# Patient Record
Sex: FEMALE | Race: WHITE | NOT HISPANIC OR LATINO
[De-identification: names, ages, dates, MRNs, and addresses within clinical notes are randomized per-mention and may not be internally consistent; named-entity substitution may affect disease eponyms.]

---

## 2019-12-16 PROBLEM — Z78.9 NON-SMOKER: Status: ACTIVE | Noted: 2019-12-16

## 2019-12-17 ENCOUNTER — APPOINTMENT (OUTPATIENT)
Dept: OBGYN | Facility: CLINIC | Age: 29
End: 2019-12-17
Payer: COMMERCIAL

## 2019-12-17 DIAGNOSIS — Z30.9 ENCOUNTER FOR CONTRACEPTIVE MANAGEMENT, UNSPECIFIED: ICD-10-CM

## 2019-12-17 DIAGNOSIS — Z72.89 OTHER PROBLEMS RELATED TO LIFESTYLE: ICD-10-CM

## 2019-12-17 DIAGNOSIS — Z01.419 ENCOUNTER FOR GYNECOLOGICAL EXAMINATION (GENERAL) (ROUTINE) W/OUT ABNORMAL FINDINGS: ICD-10-CM

## 2019-12-17 DIAGNOSIS — Z80.49 FAMILY HISTORY OF MALIGNANT NEOPLASM OF OTHER GENITAL ORGANS: ICD-10-CM

## 2019-12-17 DIAGNOSIS — Z80.3 FAMILY HISTORY OF MALIGNANT NEOPLASM OF BREAST: ICD-10-CM

## 2019-12-17 DIAGNOSIS — Z78.9 OTHER SPECIFIED HEALTH STATUS: ICD-10-CM

## 2019-12-17 PROCEDURE — 99395 PREV VISIT EST AGE 18-39: CPT

## 2019-12-27 PROBLEM — Z80.3 FAMILY HISTORY OF BREAST CANCER: Status: ACTIVE | Noted: 2019-12-27

## 2019-12-27 PROBLEM — Z80.49 FAMILY HISTORY OF MALIGNANT NEOPLASM OF ENDOMETRIUM: Status: ACTIVE | Noted: 2019-12-27

## 2020-02-20 ENCOUNTER — TRANSCRIPTION ENCOUNTER (OUTPATIENT)
Age: 30
End: 2020-02-20

## 2020-02-21 ENCOUNTER — TRANSCRIPTION ENCOUNTER (OUTPATIENT)
Age: 30
End: 2020-02-21

## 2020-12-21 PROBLEM — Z01.419 ENCOUNTER FOR GYNECOLOGICAL EXAMINATION: Status: RESOLVED | Noted: 2019-12-17 | Resolved: 2020-12-21

## 2021-03-05 ENCOUNTER — NON-APPOINTMENT (OUTPATIENT)
Age: 31
End: 2021-03-05

## 2021-04-02 ENCOUNTER — APPOINTMENT (OUTPATIENT)
Dept: INTERNAL MEDICINE | Facility: CLINIC | Age: 31
End: 2021-04-02
Payer: COMMERCIAL

## 2021-04-02 VITALS
DIASTOLIC BLOOD PRESSURE: 80 MMHG | TEMPERATURE: 98 F | HEIGHT: 68.5 IN | HEART RATE: 66 BPM | BODY MASS INDEX: 25.92 KG/M2 | OXYGEN SATURATION: 100 % | SYSTOLIC BLOOD PRESSURE: 130 MMHG | WEIGHT: 173 LBS

## 2021-04-02 DIAGNOSIS — L70.9 ACNE, UNSPECIFIED: ICD-10-CM

## 2021-04-02 PROCEDURE — 99385 PREV VISIT NEW AGE 18-39: CPT | Mod: 25

## 2021-04-02 PROCEDURE — 99072 ADDL SUPL MATRL&STAF TM PHE: CPT

## 2021-04-02 PROCEDURE — 36415 COLL VENOUS BLD VENIPUNCTURE: CPT

## 2021-04-02 NOTE — HISTORY OF PRESENT ILLNESS
[FreeTextEntry1] : Routine physical exam. [de-identified] : This is a 30-year-old female for complete physical. Past medical history significant for mild anxiety disorder and acne. She sees a therapist about every other week. She is on Xanax 0.25 mg b.i.d. p.r.n. which she takes only rarely. Past surgery appendectomy. Medications Aldactone 50 mg daily for acne and Xanax as above. Allergies none. Social history nonsmoker nondrinker. Family history is significant in that father had coronary bypass surgery and bladder cancer. She is 5 feet 9 weight 175. She is  with no children. She has appointment for GYN exam in a few weeks. Current blood pressure 125/85.

## 2021-04-02 NOTE — ASSESSMENT
[FreeTextEntry1] : Patient with mildly elevated blood pressure of 125/85. Patient is advised to check her blood pressure at the time of her GYN exam in a few weeks. Routine labs are drawn. Patient will call for results.

## 2021-04-09 ENCOUNTER — RX RENEWAL (OUTPATIENT)
Age: 31
End: 2021-04-09

## 2021-04-14 LAB
25(OH)D3 SERPL-MCNC: 17.8 NG/ML
ALBUMIN SERPL ELPH-MCNC: 5.1 G/DL
ALP BLD-CCNC: 39 U/L
ALT SERPL-CCNC: 10 U/L
ANION GAP SERPL CALC-SCNC: 16 MMOL/L
AST SERPL-CCNC: 22 U/L
BASOPHILS # BLD AUTO: 0.04 K/UL
BASOPHILS NFR BLD AUTO: 0.4 %
BILIRUB SERPL-MCNC: 0.5 MG/DL
BUN SERPL-MCNC: 15 MG/DL
CALCIUM SERPL-MCNC: 9.9 MG/DL
CHLORIDE SERPL-SCNC: 100 MMOL/L
CHOLEST SERPL-MCNC: 203 MG/DL
CO2 SERPL-SCNC: 22 MMOL/L
CREAT SERPL-MCNC: 0.94 MG/DL
EOSINOPHIL # BLD AUTO: 0.02 K/UL
EOSINOPHIL NFR BLD AUTO: 0.2 %
ESTIMATED AVERAGE GLUCOSE: 94 MG/DL
GLUCOSE SERPL-MCNC: 85 MG/DL
HBA1C MFR BLD HPLC: 4.9 %
HCT VFR BLD CALC: 44 %
HDLC SERPL-MCNC: 104 MG/DL
HGB BLD-MCNC: 13.3 G/DL
IMM GRANULOCYTES NFR BLD AUTO: 0.3 %
LDLC SERPL CALC-MCNC: 85 MG/DL
LYMPHOCYTES # BLD AUTO: 2.16 K/UL
LYMPHOCYTES NFR BLD AUTO: 21.3 %
MAN DIFF?: NORMAL
MCHC RBC-ENTMCNC: 29.6 PG
MCHC RBC-ENTMCNC: 30.2 GM/DL
MCV RBC AUTO: 97.8 FL
MONOCYTES # BLD AUTO: 0.62 K/UL
MONOCYTES NFR BLD AUTO: 6.1 %
NEUTROPHILS # BLD AUTO: 7.26 K/UL
NEUTROPHILS NFR BLD AUTO: 71.7 %
NONHDLC SERPL-MCNC: 99 MG/DL
PLATELET # BLD AUTO: 213 K/UL
POTASSIUM SERPL-SCNC: 4.1 MMOL/L
PROT SERPL-MCNC: 7.2 G/DL
RBC # BLD: 4.5 M/UL
RBC # FLD: 12.4 %
SODIUM SERPL-SCNC: 138 MMOL/L
TRIGL SERPL-MCNC: 73 MG/DL
TSH SERPL-ACNC: 2.49 UIU/ML
WBC # FLD AUTO: 10.13 K/UL

## 2021-05-07 ENCOUNTER — NON-APPOINTMENT (OUTPATIENT)
Age: 31
End: 2021-05-07

## 2021-05-07 ENCOUNTER — APPOINTMENT (OUTPATIENT)
Dept: OBGYN | Facility: CLINIC | Age: 31
End: 2021-05-07
Payer: COMMERCIAL

## 2021-05-07 DIAGNOSIS — Z01.419 ENCOUNTER FOR GYNECOLOGICAL EXAMINATION (GENERAL) (ROUTINE) W/OUT ABNORMAL FINDINGS: ICD-10-CM

## 2021-05-07 PROCEDURE — 99395 PREV VISIT EST AGE 18-39: CPT

## 2021-05-07 PROCEDURE — 99072 ADDL SUPL MATRL&STAF TM PHE: CPT

## 2021-05-11 PROBLEM — Z01.419 ENCOUNTER FOR ANNUAL ROUTINE GYNECOLOGICAL EXAMINATION: Status: RESOLVED | Noted: 2021-05-07 | Resolved: 2021-05-21

## 2021-05-11 NOTE — HISTORY OF PRESENT ILLNESS
[FreeTextEntry1] : 30 y o P0 female presents for routine annual GYN care\par Last pap smear 1/2019 NILM\par Denies current GYN complaints\par Reports normal bowel and bladder function\par Sexually active w/ male partner : uses combination OC w/o adverse effects\par Has monthly menses: normal flow/duration

## 2021-06-01 ENCOUNTER — APPOINTMENT (OUTPATIENT)
Dept: INTERNAL MEDICINE | Facility: CLINIC | Age: 31
End: 2021-06-01
Payer: COMMERCIAL

## 2021-06-01 VITALS
TEMPERATURE: 96.5 F | DIASTOLIC BLOOD PRESSURE: 80 MMHG | SYSTOLIC BLOOD PRESSURE: 130 MMHG | OXYGEN SATURATION: 99 % | BODY MASS INDEX: 26.97 KG/M2 | WEIGHT: 180 LBS | HEIGHT: 68.5 IN | HEART RATE: 75 BPM

## 2021-06-01 PROCEDURE — 99213 OFFICE O/P EST LOW 20 MIN: CPT

## 2021-06-01 PROCEDURE — 99072 ADDL SUPL MATRL&STAF TM PHE: CPT

## 2021-06-01 NOTE — HISTORY OF PRESENT ILLNESS
[FreeTextEntry1] : Increasing symptoms of depression and anxiety. [de-identified] : For the last few months patient feels she is increasingly depressed and anxious. She she is irritable with lack of motivation trouble sleeping and depressed. She previously was on Wellbutrin and gabapentin which helped her. She's been off of that for about 2 years. She sees a therapist weekly and was told that she needs medication. She has no other chronic medical problems.

## 2021-06-01 NOTE — ASSESSMENT
[FreeTextEntry1] : Patient with symptoms of depression and anxiety. We'll renew Wellbutrin  mg daily and gabapentin 100 mg t.i.d. Patient is to return in 6 weeks for followup.

## 2021-07-16 ENCOUNTER — APPOINTMENT (OUTPATIENT)
Dept: INTERNAL MEDICINE | Facility: CLINIC | Age: 31
End: 2021-07-16
Payer: COMMERCIAL

## 2021-07-16 VITALS
SYSTOLIC BLOOD PRESSURE: 120 MMHG | DIASTOLIC BLOOD PRESSURE: 80 MMHG | HEIGHT: 68.5 IN | BODY MASS INDEX: 26.97 KG/M2 | OXYGEN SATURATION: 98 % | HEART RATE: 71 BPM | TEMPERATURE: 96.7 F | WEIGHT: 180 LBS

## 2021-07-16 PROCEDURE — 99213 OFFICE O/P EST LOW 20 MIN: CPT

## 2021-07-16 PROCEDURE — 99072 ADDL SUPL MATRL&STAF TM PHE: CPT

## 2021-07-16 NOTE — ASSESSMENT
[FreeTextEntry1] : The patient is doing clinically quite well on Wellbutrin . She is to continue current dose and I will renew medicines as needed

## 2021-07-16 NOTE — HISTORY OF PRESENT ILLNESS
[FreeTextEntry1] : Followup anxiety and depression. [de-identified] : Patient is feeling much better since starting Wellbutrin XL 50 mg daily. She is not taking gabapentin now she doesn't feel the need to take a higher dose of medication. She denies side effects. She denies anxiety with depression she feels much better. She is still seeing a therapist weekly. She wants to continue Wellbutrin at the same dose.

## 2021-08-27 LAB
CYTOLOGY CVX/VAG DOC THIN PREP: NORMAL
HPV HIGH+LOW RISK DNA PNL CVX: NOT DETECTED

## 2021-11-23 ENCOUNTER — RX RENEWAL (OUTPATIENT)
Age: 31
End: 2021-11-23

## 2021-12-27 ENCOUNTER — APPOINTMENT (OUTPATIENT)
Dept: INTERNAL MEDICINE | Facility: CLINIC | Age: 31
End: 2021-12-27
Payer: COMMERCIAL

## 2021-12-27 VITALS
DIASTOLIC BLOOD PRESSURE: 86 MMHG | SYSTOLIC BLOOD PRESSURE: 130 MMHG | HEIGHT: 68.5 IN | BODY MASS INDEX: 26.97 KG/M2 | WEIGHT: 180 LBS | HEART RATE: 71 BPM | OXYGEN SATURATION: 99 % | TEMPERATURE: 97.4 F

## 2021-12-27 DIAGNOSIS — F41.9 ANXIETY DISORDER, UNSPECIFIED: ICD-10-CM

## 2021-12-27 PROCEDURE — 99213 OFFICE O/P EST LOW 20 MIN: CPT

## 2021-12-27 NOTE — HISTORY OF PRESENT ILLNESS
[FreeTextEntry1] : Medication renewal [de-identified] : Patient with history of anxiety and depression on Wellbutrin 150 mg daily. She is doing generally well is seeing a therapist every other week. She takes Xanax 0.25 mg on an outpatient 3 times a week. She would like a renewal on Xanax. Her last renewal was about 3 months ago.

## 2021-12-27 NOTE — ASSESSMENT
[FreeTextEntry1] : Patient with anxiety and depression well controlled. We'll renew Xanax 0.25 mg daily on a p.r.n. basis

## 2022-02-15 ENCOUNTER — RX RENEWAL (OUTPATIENT)
Age: 32
End: 2022-02-15

## 2022-02-16 ENCOUNTER — RX RENEWAL (OUTPATIENT)
Age: 32
End: 2022-02-16

## 2022-04-29 ENCOUNTER — APPOINTMENT (OUTPATIENT)
Dept: INTERNAL MEDICINE | Facility: CLINIC | Age: 32
End: 2022-04-29
Payer: COMMERCIAL

## 2022-04-29 VITALS
SYSTOLIC BLOOD PRESSURE: 140 MMHG | OXYGEN SATURATION: 99 % | HEIGHT: 68.5 IN | DIASTOLIC BLOOD PRESSURE: 90 MMHG | TEMPERATURE: 96.5 F | BODY MASS INDEX: 26.97 KG/M2 | HEART RATE: 75 BPM | WEIGHT: 180 LBS

## 2022-04-29 DIAGNOSIS — Z00.00 ENCOUNTER FOR GENERAL ADULT MEDICAL EXAMINATION W/OUT ABNORMAL FINDINGS: ICD-10-CM

## 2022-04-29 DIAGNOSIS — F32.A DEPRESSION, UNSPECIFIED: ICD-10-CM

## 2022-04-29 PROCEDURE — 36415 COLL VENOUS BLD VENIPUNCTURE: CPT

## 2022-04-29 PROCEDURE — 99395 PREV VISIT EST AGE 18-39: CPT | Mod: 25

## 2022-04-29 NOTE — HISTORY OF PRESENT ILLNESS
[FreeTextEntry1] : Routine yearly physical. [de-identified] : This is a 31-year-old female with a history of anxiety and depression. She sees a therapist every other week. She is generally doing well and her psychiatric issues. She is maintained on Wellbutrin 150 daily and rare Xanax. She had covid in January. She says since then she has periods of exhaustion. This can come on in waves. It is does not occur daily. She is sleeping well. She has no shortness of breath. Her weight is 193 current blood pressure 130/85.

## 2022-04-29 NOTE — ASSESSMENT
[FreeTextEntry1] : Patient with periods of exhaustion after covid in January. It is unclear if this is related to depression. We'll renew current medications. We'll check routine labs and speak to the patient next week.

## 2022-05-05 LAB
25(OH)D3 SERPL-MCNC: 40.2 NG/ML
ALBUMIN SERPL ELPH-MCNC: 4.7 G/DL
ALP BLD-CCNC: 47 U/L
ALT SERPL-CCNC: 11 U/L
ANION GAP SERPL CALC-SCNC: 18 MMOL/L
AST SERPL-CCNC: 17 U/L
BASOPHILS # BLD AUTO: 0.04 K/UL
BASOPHILS NFR BLD AUTO: 0.5 %
BILIRUB SERPL-MCNC: 0.4 MG/DL
BUN SERPL-MCNC: 12 MG/DL
CALCIUM SERPL-MCNC: 9.6 MG/DL
CHLORIDE SERPL-SCNC: 106 MMOL/L
CHOLEST SERPL-MCNC: 219 MG/DL
CO2 SERPL-SCNC: 20 MMOL/L
CREAT SERPL-MCNC: 0.9 MG/DL
EGFR: 88 ML/MIN/1.73M2
EOSINOPHIL # BLD AUTO: 0.01 K/UL
EOSINOPHIL NFR BLD AUTO: 0.1 %
ESTIMATED AVERAGE GLUCOSE: 100 MG/DL
GLUCOSE SERPL-MCNC: 99 MG/DL
HBA1C MFR BLD HPLC: 5.1 %
HCT VFR BLD CALC: 42.3 %
HDLC SERPL-MCNC: 100 MG/DL
HGB BLD-MCNC: 13.4 G/DL
IMM GRANULOCYTES NFR BLD AUTO: 0.3 %
LDLC SERPL CALC-MCNC: 105 MG/DL
LYMPHOCYTES # BLD AUTO: 2.26 K/UL
LYMPHOCYTES NFR BLD AUTO: 28.9 %
MAN DIFF?: NORMAL
MCHC RBC-ENTMCNC: 30 PG
MCHC RBC-ENTMCNC: 31.7 GM/DL
MCV RBC AUTO: 94.8 FL
MONOCYTES # BLD AUTO: 0.56 K/UL
MONOCYTES NFR BLD AUTO: 7.2 %
NEUTROPHILS # BLD AUTO: 4.92 K/UL
NEUTROPHILS NFR BLD AUTO: 63 %
NONHDLC SERPL-MCNC: 119 MG/DL
PLATELET # BLD AUTO: 197 K/UL
POTASSIUM SERPL-SCNC: 4.1 MMOL/L
PROT SERPL-MCNC: 7.2 G/DL
RBC # BLD: 4.46 M/UL
RBC # FLD: 11.7 %
SODIUM SERPL-SCNC: 143 MMOL/L
TRIGL SERPL-MCNC: 66 MG/DL
TSH SERPL-ACNC: 2.42 UIU/ML
WBC # FLD AUTO: 7.81 K/UL

## 2022-05-13 ENCOUNTER — APPOINTMENT (OUTPATIENT)
Dept: OBGYN | Facility: CLINIC | Age: 32
End: 2022-05-13
Payer: COMMERCIAL

## 2022-05-13 ENCOUNTER — NON-APPOINTMENT (OUTPATIENT)
Age: 32
End: 2022-05-13

## 2022-05-13 VITALS
HEIGHT: 68.5 IN | SYSTOLIC BLOOD PRESSURE: 120 MMHG | WEIGHT: 190 LBS | DIASTOLIC BLOOD PRESSURE: 70 MMHG | BODY MASS INDEX: 28.46 KG/M2

## 2022-05-13 DIAGNOSIS — Z01.419 ENCOUNTER FOR GYNECOLOGICAL EXAMINATION (GENERAL) (ROUTINE) W/OUT ABNORMAL FINDINGS: ICD-10-CM

## 2022-05-13 PROCEDURE — 99395 PREV VISIT EST AGE 18-39: CPT

## 2022-05-13 RX ORDER — SPIRONOLACTONE 50 MG/1
50 TABLET ORAL DAILY
Qty: 90 | Refills: 3 | Status: DISCONTINUED | COMMUNITY
Start: 2021-04-02 | End: 2022-05-13

## 2022-05-13 RX ORDER — GABAPENTIN 100 MG/1
100 CAPSULE ORAL 3 TIMES DAILY
Qty: 90 | Refills: 5 | Status: DISCONTINUED | COMMUNITY
Start: 2021-06-01 | End: 2022-05-13

## 2023-05-05 ENCOUNTER — APPOINTMENT (OUTPATIENT)
Dept: INTERNAL MEDICINE | Facility: CLINIC | Age: 33
End: 2023-05-05

## 2023-05-16 ENCOUNTER — APPOINTMENT (OUTPATIENT)
Dept: OBGYN | Facility: CLINIC | Age: 33
End: 2023-05-16

## 2024-06-01 ENCOUNTER — RESULT REVIEW (OUTPATIENT)
Age: 34
End: 2024-06-01

## 2024-06-01 ENCOUNTER — TRANSCRIPTION ENCOUNTER (OUTPATIENT)
Age: 34
End: 2024-06-01

## 2024-06-03 ENCOUNTER — RESULT REVIEW (OUTPATIENT)
Age: 34
End: 2024-06-03

## 2024-06-05 ENCOUNTER — TRANSCRIPTION ENCOUNTER (OUTPATIENT)
Age: 34
End: 2024-06-05

## 2024-06-06 ENCOUNTER — NON-APPOINTMENT (OUTPATIENT)
Age: 34
End: 2024-06-06

## 2024-06-06 ENCOUNTER — APPOINTMENT (OUTPATIENT)
Dept: NEUROLOGY | Facility: CLINIC | Age: 34
End: 2024-06-06
Payer: COMMERCIAL

## 2024-06-06 VITALS
OXYGEN SATURATION: 98 % | DIASTOLIC BLOOD PRESSURE: 80 MMHG | HEIGHT: 68 IN | HEART RATE: 79 BPM | SYSTOLIC BLOOD PRESSURE: 131 MMHG

## 2024-06-06 DIAGNOSIS — G95.9 DISEASE OF SPINAL CORD, UNSPECIFIED: ICD-10-CM

## 2024-06-06 PROCEDURE — 99215 OFFICE O/P EST HI 40 MIN: CPT

## 2024-06-06 PROCEDURE — 99205 OFFICE O/P NEW HI 60 MIN: CPT

## 2024-06-06 RX ORDER — ALPRAZOLAM 0.25 MG/1
0.25 TABLET ORAL
Qty: 60 | Refills: 0 | Status: DISCONTINUED | COMMUNITY
Start: 2021-12-20 | End: 2024-06-06

## 2024-06-06 RX ORDER — NORGESTIMATE AND ETHINYL ESTRADIOL 7DAYSX3 LO
0.18/0.215/0.25 KIT ORAL
Qty: 84 | Refills: 3 | Status: DISCONTINUED | COMMUNITY
Start: 2019-12-17 | End: 2024-06-06

## 2024-06-06 RX ORDER — BUPROPION HYDROCHLORIDE 150 MG/1
150 TABLET, EXTENDED RELEASE ORAL DAILY
Qty: 90 | Refills: 3 | Status: DISCONTINUED | COMMUNITY
Start: 2022-04-29 | End: 2024-06-06

## 2024-06-06 RX ORDER — ALPRAZOLAM 0.25 MG/1
0.25 TABLET ORAL
Qty: 60 | Refills: 0 | Status: DISCONTINUED | COMMUNITY
Start: 2022-11-07 | End: 2024-06-06

## 2024-06-06 RX ORDER — ALPRAZOLAM 0.25 MG/1
0.25 TABLET ORAL
Qty: 60 | Refills: 0 | Status: DISCONTINUED | COMMUNITY
Start: 2021-04-02 | End: 2024-06-06

## 2024-06-06 RX ORDER — ALPRAZOLAM 0.25 MG/1
0.25 TABLET ORAL
Qty: 60 | Refills: 0 | Status: DISCONTINUED | COMMUNITY
Start: 2022-04-29 | End: 2024-06-06

## 2024-06-06 RX ORDER — ALPRAZOLAM 0.25 MG/1
0.25 TABLET ORAL
Qty: 60 | Refills: 0 | Status: DISCONTINUED | COMMUNITY
Start: 2022-08-09 | End: 2024-06-06

## 2024-06-06 RX ORDER — PRENATAL VIT,CAL 74/IRON/FOLIC 27 MG-1 MG
TABLET ORAL
Refills: 0 | Status: ACTIVE | COMMUNITY

## 2024-06-12 ENCOUNTER — NON-APPOINTMENT (OUTPATIENT)
Age: 34
End: 2024-06-12

## 2024-06-12 NOTE — HISTORY OF PRESENT ILLNESS
[FreeTextEntry1] : 6/6/24 This is a 34 y/o RH woman who is being seen in neurologic consultation for possible myelitis. She is 5 weeks pregnant.   She was admitted to [facility] after 2 weeks of progressive left leg numbness from hip/buttock to toe that eventually crossed midline involving groin area and developed left leg weakness as well 5 days prior to admission. She was placed in observation unit, lumbar MRI: abnormal signal in the distal cord and conus medullaris indicative of myelopathy, small herniation L4-5, small protrusion L5-S1 mild underlying degenerative changes.  "... at the level of T11-T12 that may represent a spinal cord mass, evolving spinal cord infarct, demyelinating disease, an infectious process, or be inflammatory in etiology, old mild superior endplate compression fracture at T9, multilevel cervical spondylosis, mild spinal canal stenosis and mild-to-moderate neuroforaminal stenosis." MRI brain: normal. Repeat MRI of cervical, thoracic, and lumbar spines with contrast (risk/benefit discussed as patient's pregnancy test came back positive, LMP 05/01/2024 making AOG 4 weeks, benefit at this time outweigh risk - evaluated by OB): enhancing 9 x 9 mm lesion in the left aspect of the thoracic cord at the level of the upper T12 vertebral body. "  FHx significant for an uncle with undiagnosed neurodegenerative d/o (possible ALS but with atypical symptoms) and paternal grandmother with nonspecific autoimmune d/o, patient's mother had multiple miscarriages.  There was a multispecialty meeting with neurology, neuroradiology, neurosurgery, and ICU (including [facility] neurologist neuroradiologist) Top differential dx: autoimmune inflammatory process, less likely malignancy (although ESR CRP are low) She finished 5 days of steroids and is on a tapering dose.

## 2024-06-12 NOTE — ASSESSMENT
[FreeTextEntry1] : Autoimmune myelitis/myelopathy CSF studies still pending  CSF flow cytometry pending No cytology sent CSF NMO - pending  Oligoclonal bands- pending  CSF- pending  CSF [place] IgG Ab pending CSF west IgM Ab pending   CSF glucose 57 CSF total protein 41  CSF WBC tube 1 1/1, tube 4 20 CSF RBC tube 1 0, tube 4 35  (lymphocytes 71%)  CSF IgG- 2/2 CSF IgG/Albumin 0.13 CSF/serum IgG index 0/7 CSF/Serum IgG Synthesis 0.9 mg/day  I did not find serum MOG or NMO.  Will request neuro-immunology consult from Dr. Madden  At present she is on tapering dose of Prednisone (60 mg with plans for weaning off over one week)- She will be on 30 mg on friday June 14th. Will likely need to maintain on prednisone.  Names of high risk OB doctors given to patient - she has an appointment with general OB Dr. Shamir Richardson.

## 2024-06-12 NOTE — DATA REVIEWED
[de-identified] : Radiology MRI: MR CERVICAL, THORACIC, AND LUMBAR SPINE WITH CONTRAST  TECHNIQUE: Multiplanar multisequence imaging of the cervical, thoracic, and lumbar spine was performed after the administration of intravenous contrast.  CONTRAST: 8 mL gadavist was administered. 0 mL was discarded.  CLINICAL INDICATION: Bilateral lower extremity numbness and left lower extremity weakness. COMPARISON: MR of the cervical, thoracic, and lumbar spine without contrast 6/1/2024. ____________________ FINDINGS:  CERVICAL SPINE: Similar cervical spine alignment as on 6/1/2024. Cervical vertebral body heights appear maintained. No suspicious enhancing osseous foci.  No suspicious foci of intrathecal enhancement.   THORACIC SPINE: Mildly degraded by motion artifact. Similar thoracic alignment as on 6/1/2024. Unchanged mild superior endplate compression fracture at T9. Remainder of the thoracic vertebral body heights are maintained. No enhancing aggressive osseous lesions.  On the axial postcontrast sequence there is subtle enhancement in the left posterior aspect of the cord extending from the level of the T11-T12 intervertebral disc space to the level of the mid T12 vertebral body with a slight ovoid appearance and measures approximately 4 x 2 mm images 56-58 of series 23. There is a 9 x 9 mm focus of enhancement in the posterior left aspect of the cord better seen on the postcontrast imaging of the lumbar spine image 10 of series 19 at the upper aspect of the T12 vertebral body..  LUMBAR SPINE: Mildly degraded by motion artifact.  Similar lumbar alignment as on 6/1/2024. Lumbar vertebral body heights are maintained. No enhancing aggressive osseous lesions.  There is a 9 x 9 mm focus of enhancement in the posterior left aspect of the cord at the level of the upper T12 vertebral body. Otherwise, no suspicious foci intrathecal enhancement in the lumbar spine. ____________________ IMPRESSION:  1. There is an enhancing 9 x 9 mm lesion in the left aspect of the thoracic cord at the level of the upper T12 vertebral body best seen on the postcontrast MR images of the lumbar spine image 10 of series 19. Differential considerations include an ependymoma, astrocytoma, or demyelinating disease among other less likely possibilities.  This report was sent via Teams to Dr. Palak Rose at 6/2/2024 9:19 PM.  --- End of Report ---  Electronically Signed: ____________________________________________ Edmund Hernandez MD 06/02/24 2120

## 2024-06-12 NOTE — PHYSICAL EXAM
[FreeTextEntry1] : Physical examination  General: No acute distress, Awake, Alert.   Mental status  Awake, alert, and oriented to person, time and place, Normal attention span and concentration, Recent and remote memory intact, Language intact, Fund of knowledge intact.  Cranial Nerves  II: VFF  III, IV, VI: PERRL, EOMI.  V: Facial sensation is normal B/L.  VII: Facial strength is normal B/L.  VIII: Gross hearing is intact.  IX, X: Palate is midline and elevates symmetrically.  XI: Trapezius normal strength.  XII: Tongue midline without atrophy or fasciculations.   Motor exam  Muscle tone - no evidence of rigidity or resistance in all 4 extremities.  No atrophy or fasciculations  Muscle Strength: Arms- normal Legs- LLE proximal 4+, distal 4+ including EHL RLE proximal 4+, distal 5/5 No UE drift. 5/5 strength b/l UE  Reflexes  All present, normal, and symmetrical.  brisk reflexes in the knees Plantars right: mute.  Plantars left: mute.   Coordination  Finger to nose: Normal.  Heel to shin: Normal.   Sensory  Impaired vibration - absent on the left, inconsistent pp; proprioception intact Romberg present  Gait  wide-based; steady with walker

## 2024-06-13 ENCOUNTER — APPOINTMENT (OUTPATIENT)
Dept: NEUROSURGERY | Facility: CLINIC | Age: 34
End: 2024-06-13

## 2024-06-14 ENCOUNTER — APPOINTMENT (OUTPATIENT)
Dept: NEUROLOGY | Facility: CLINIC | Age: 34
End: 2024-06-14
Payer: COMMERCIAL

## 2024-06-14 VITALS
TEMPERATURE: 98 F | OXYGEN SATURATION: 99 % | DIASTOLIC BLOOD PRESSURE: 90 MMHG | HEIGHT: 68 IN | SYSTOLIC BLOOD PRESSURE: 142 MMHG | WEIGHT: 185 LBS | HEART RATE: 95 BPM | BODY MASS INDEX: 28.04 KG/M2

## 2024-06-14 PROCEDURE — G2211 COMPLEX E/M VISIT ADD ON: CPT

## 2024-06-14 PROCEDURE — 99417 PROLNG OP E/M EACH 15 MIN: CPT

## 2024-06-14 PROCEDURE — G2212 PROLONG OUTPT/OFFICE VIS: CPT

## 2024-06-14 PROCEDURE — 99215 OFFICE O/P EST HI 40 MIN: CPT

## 2024-06-14 RX ORDER — PREDNISONE 5 MG/1
5 TABLET ORAL
Qty: 200 | Refills: 3 | Status: ACTIVE | COMMUNITY
Start: 2024-06-14 | End: 1900-01-01

## 2024-06-14 RX ORDER — PREDNISONE 50 MG/1
TABLET ORAL
Refills: 0 | Status: ACTIVE | COMMUNITY

## 2024-06-14 RX ORDER — PREDNISONE 2.5 MG/1
2.5 TABLET ORAL
Qty: 200 | Refills: 3 | Status: ACTIVE | COMMUNITY
Start: 2024-06-14 | End: 1900-01-01

## 2024-06-14 RX ORDER — PREDNISONE 10 MG/1
10 TABLET ORAL
Qty: 200 | Refills: 3 | Status: ACTIVE | COMMUNITY
Start: 2024-06-14 | End: 1900-01-01

## 2024-06-14 NOTE — ASSESSMENT
[FreeTextEntry1] : 33-year-old woman with a lower thoracic/conus myelitis presenting with lower extremity dysesthesia, R>L leg weakness, sensory ataxia with gait dysfunction.   She demonstrates early clinical response to corticosteroids with residual sensorimotor and gait abnormalities.  MRI demonstrates a centrally predominant conus lesion with peripheral enhancement suggestive of CNS demyelination.  CSF inflammatory with pleocytosis and oligoclonal bands. 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  It does not appear to be tumor, infection, or vascular in nature, more likely immune-mediated.  She will be comanaged along with maternal-fetal medicine to mitigate risk to the pregnancy.  We discussed some of the potential risks of corticosteroids during pregnancy including cardiovascular, endocrine, GI, infectious, ophthalmic, osteopenia and osteoporosis, avascular necrosis of the hip, psychiatric effects and sleep disturbance and risks on the pregnancy including teratogenic effects including cleft palate and hypoadrenalism.  Plan: Taper prednisone from 30 mg to 20 mg after 2 weeks then decrease by 5 mg every 2 weeks until at 10 mg and decrease by 2.5 mg every 2 weeks until discontinued. We discussed deferring Bactrim and risedronate due to uncertain effects on the pregnancy although these are used to mitigate risk of pneumocystis and osteopenia, deferred to limit exposure to the developing fetus, particularly as we are tapering below 20 mg relatively quickly. Can consider increasing dose of corticosteroids and adding IVIG if clinical deterioration Urgent repeat MRI brain cervical and thoracic spine.  Deferred gadolinium use per patient preference to avoid effects on the fetus.  For authorization: The thoracic MRI is to evaluate for treatment response and evolution of the lesion while the MRI brain and cervical spine are to assess for new lesions as there is known tendency for radiologic lag in certain neuroinflammatory disorders, like, MOGAD. Check serum aquaporin 4 and MOG antibody status and preinitiation labs in case further immunotherapy is required Explained that if clinical deterioration to go to the emergency department and contact our office immediately Establish high risk maternal-fetal medicine consultation Referred to physiatry and physical therapy 1 month virtual visit

## 2024-06-14 NOTE — PHYSICAL EXAM
[FreeTextEntry1] : AO3. Normally conversant. full affect. Follows commands, names, and repeats. Good attention.     PERRL, VFF, EOMI, no nystagmus, face symmetric, TUP at midline.     Motor:        R:    L:  Del      5    5  Bi      5    5  Tri      5    5  Wrist Extensors   5    5  Finger abductors   5    5       5    5     HF      5    4+  KE      5    5  KF      5    5  DF      5    5  PF      5    5     Tone     R    L  UE        0     0  LE        0     0     Sensory    diminished vibration above the knees and sensory level at the waist to pinprick and temperature   Reflexes:        R    L   Biceps   2 2  BR    1 1  Pat     1 0  AJ     1 0     TOES     mute    E     Coordination:        R    L  FTN     0    0  HEDY     0    0  HTS     0    0       Gait: Unsteady gait        EDSS Step: 4.5       Functional system scores: Visual 0 Brainstem 0 Pyramidal 4 Cerebellar 0 Sensory 3 Bowel and bladder 0 Cerebral 0

## 2024-06-14 NOTE — DATA REVIEWED
[de-identified] : Lumbar puncture June 4, 2024 CSF >20, RBC 0, protein 35, glucose 66 lymphocytic predominant, oligoclonal bands present, cytology/flow cytometry negative for malignancy, PCR negative, cultures negative, syphilis negative  aquaporin 4 negative (tested in CSF, not the preferred specimen) Lyme negative B12 735 Folate greater than 20 IgG index 0.7 HIV negative Vitamin D40.2 TSH 2.4 A1c 5.2  MRI brain cervical and thoracic with and without gadolinium June 2024 demonstrates a lower thoracic conus lesion centrally predominant mildly expansile T2 hyperintensity with a subtle focus of enhancement

## 2024-06-14 NOTE — HISTORY OF PRESENT ILLNESS
[FreeTextEntry1] : 33-year-old woman 6 weeks pregnant with a conus spinal cord lesion referred to the autoimmune brain disorder center at [facility] for further diagnostic workup and management.  Symptoms began May 13, 2024 with right buttock tingling.  May 21 she discovered that she was pregnant with a positive pregnancy test.  May 23 she developed a painful cramp of the left thigh.  May 25 there was pins-and-needles in both legs.  By May 26 both legs were entirely numb by June 1 she developed associated weakness and gait dysfunction as well as the numbness from the pelvis down both of her legs.  She got MRIs that demonstrated a lower thoracic conus spinal cord lesion.  She received IV methylprednisolone 1 g for 5 days followed by prednisone 60 mg tapered every 2 days x 10 mg and is currently on 30 mg daily.  June 11 she felt as though she had more left knee numbness and questionable change in the sensory characteristics in the back of the thigh with pins-and-needles and burning sensation.  She has been referred to a high risk maternal-fetal medicine specialist with an appointment July 8, 2024 (Dr. Yassine Carmona).  She has only been taking Tylenol for the neuropathic pain in her legs and finds it to be tolerable at this time.  She has significant gait dysfunction and feels as though she does not know where her legs are in space.  Since starting the corticosteroids she has experienced an improvement in motor strength but still has residual sensory dysfunction.

## 2024-06-15 ENCOUNTER — LABORATORY RESULT (OUTPATIENT)
Age: 34
End: 2024-06-15

## 2024-06-17 LAB
25(OH)D3 SERPL-MCNC: 26.7 NG/ML
ALBUMIN MFR SERPL ELPH: 63 %
ALBUMIN SERPL ELPH-MCNC: 4.8 G/DL
ALBUMIN SERPL-MCNC: 4.5 G/DL
ALBUMIN/GLOB SERPL: 1.7 RATIO
ALP BLD-CCNC: 47 U/L
ALPHA1 GLOB MFR SERPL ELPH: 3.9 %
ALPHA1 GLOB SERPL ELPH-MCNC: 0.3 G/DL
ALPHA2 GLOB MFR SERPL ELPH: 9.4 %
ALPHA2 GLOB SERPL ELPH-MCNC: 0.7 G/DL
ALT SERPL-CCNC: 30 U/L
ANION GAP SERPL CALC-SCNC: 15 MMOL/L
AST SERPL-CCNC: 15 U/L
B-GLOBULIN MFR SERPL ELPH: 10.6 %
B-GLOBULIN SERPL ELPH-MCNC: 0.8 G/DL
BASOPHILS # BLD AUTO: 0.02 K/UL
BASOPHILS NFR BLD AUTO: 0.1 %
BILIRUB SERPL-MCNC: 0.9 MG/DL
BUN SERPL-MCNC: 12 MG/DL
C3 SERPL-MCNC: 112 MG/DL
C4 SERPL-MCNC: 16 MG/DL
CALCIUM SERPL-MCNC: 9.9 MG/DL
CD16+CD56+ CELLS # BLD: 91 CELLS/UL
CD16+CD56+ CELLS NFR BLD: 7 %
CD19 CELLS NFR BLD: 212 CELLS/UL
CD3 CELLS # BLD: 1007 CELLS/UL
CD3 CELLS NFR BLD: 74 %
CD3+CD4+ CELLS # BLD: 516 CELLS/UL
CD3+CD4+ CELLS NFR BLD: 37 %
CD3+CD4+ CELLS/CD3+CD8+ CLL SPEC: 1.29 RATIO
CD3+CD8+ CELLS # SPEC: 399 CELLS/UL
CD3+CD8+ CELLS NFR BLD: 29 %
CELLS.CD3-CD19+/CELLS IN BLOOD: 16 %
CHLORIDE SERPL-SCNC: 101 MMOL/L
CHOLEST SERPL-MCNC: 217 MG/DL
CO2 SERPL-SCNC: 21 MMOL/L
CREAT SERPL-MCNC: 0.63 MG/DL
DEPRECATED KAPPA LC FREE/LAMBDA SER: 1.02 RATIO
EGFR: 121 ML/MIN/1.73M2
ENA RNP AB SER IA-ACNC: <0.2 AL
ENA SM AB SER IA-ACNC: <0.2 AL
EOSINOPHIL # BLD AUTO: 0 K/UL
EOSINOPHIL NFR BLD AUTO: 0 %
ESTIMATED AVERAGE GLUCOSE: 105 MG/DL
GAMMA GLOB FLD ELPH-MCNC: 0.9 G/DL
GAMMA GLOB MFR SERPL ELPH: 13.1 %
GLUCOSE SERPL-MCNC: 101 MG/DL
HBA1C MFR BLD HPLC: 5.3 %
HBV CORE IGG+IGM SER QL: NONREACTIVE
HBV SURFACE AB SERPL IA-ACNC: 79 MIU/ML
HBV SURFACE AG SER QL: NONREACTIVE
HCT VFR BLD CALC: 42.3 %
HCV AB SER QL: NONREACTIVE
HCV S/CO RATIO: 0.07 S/CO
HDLC SERPL-MCNC: 98 MG/DL
HGB BLD-MCNC: 13.5 G/DL
HIV1+2 AB SPEC QL IA.RAPID: NONREACTIVE
IGA SER QL IEP: 127 MG/DL
IGG SER QL IEP: 802 MG/DL
IGM SER QL IEP: 264 MG/DL
IMM GRANULOCYTES NFR BLD AUTO: 0.7 %
INTERPRETATION SERPL IEP-IMP: NORMAL
KAPPA LC CSF-MCNC: 0.65 MG/DL
KAPPA LC SERPL-MCNC: 0.66 MG/DL
LDLC SERPL CALC-MCNC: 108 MG/DL
LYMPHOCYTES # BLD AUTO: 1.52 K/UL
LYMPHOCYTES NFR BLD AUTO: 9.5 %
MAN DIFF?: NORMAL
MCHC RBC-ENTMCNC: 29.5 PG
MCHC RBC-ENTMCNC: 31.9 GM/DL
MCV RBC AUTO: 92.6 FL
MONOCYTES # BLD AUTO: 0.39 K/UL
MONOCYTES NFR BLD AUTO: 2.4 %
NEUTROPHILS # BLD AUTO: 13.96 K/UL
NEUTROPHILS NFR BLD AUTO: 87.3 %
NONHDLC SERPL-MCNC: 119 MG/DL
PLATELET # BLD AUTO: 287 K/UL
POTASSIUM SERPL-SCNC: 4.4 MMOL/L
PROT SERPL-MCNC: 7.1 G/DL
RBC # BLD: 4.57 M/UL
RBC # FLD: 12 %
RHEUMATOID FACT SER QL: <10 IU/ML
SODIUM SERPL-SCNC: 137 MMOL/L
T PALLIDUM AB SER QL IA: NEGATIVE
TRIGL SERPL-MCNC: 59 MG/DL
TSH SERPL-ACNC: 0.58 UIU/ML
VIT B12 SERPL-MCNC: 838 PG/ML
WBC # FLD AUTO: 16.01 K/UL

## 2024-06-18 LAB — ANA SER IF-ACNC: NEGATIVE

## 2024-06-19 LAB
M TB IFN-G BLD-IMP: NEGATIVE
QUANTIFERON TB PLUS MITOGEN MINUS NIL: 4.46 IU/ML
QUANTIFERON TB PLUS NIL: 0.02 IU/ML
QUANTIFERON TB PLUS TB1 MINUS NIL: 0.02 IU/ML
QUANTIFERON TB PLUS TB2 MINUS NIL: 0.01 IU/ML
SSA-52 (RO52) (ENA) ANTIBODY, IGG: 1 AU/ML
SSA-60 (RO60) (ENA) ANTIBODY, IGG: 0 AU/ML
VZV AB TITR SER: POSITIVE
VZV IGG SER IF-ACNC: 513.9 INDEX

## 2024-06-20 LAB
AQP4 H2O CHANNEL AB SERPL IA-ACNC: NEGATIVE
JCV INDEX: 2.25
STRATIFY JCV ANTIBODY: POSITIVE

## 2024-06-21 ENCOUNTER — APPOINTMENT (OUTPATIENT)
Dept: PAIN MANAGEMENT | Facility: CLINIC | Age: 34
End: 2024-06-21
Payer: COMMERCIAL

## 2024-06-21 VITALS
DIASTOLIC BLOOD PRESSURE: 79 MMHG | SYSTOLIC BLOOD PRESSURE: 142 MMHG | BODY MASS INDEX: 28.73 KG/M2 | HEIGHT: 68 IN | WEIGHT: 189.59 LBS | OXYGEN SATURATION: 100 % | HEART RATE: 80 BPM

## 2024-06-21 PROCEDURE — 99203 OFFICE O/P NEW LOW 30 MIN: CPT

## 2024-06-21 RX ORDER — MAGNESIUM OXIDE/MAG AA CHELATE 300 MG
CAPSULE ORAL
Refills: 0 | Status: ACTIVE | COMMUNITY

## 2024-06-21 RX ORDER — BUPROPION HYDROCHLORIDE 150 MG/1
150 TABLET, FILM COATED, EXTENDED RELEASE ORAL
Qty: 30 | Refills: 5 | Status: DISCONTINUED | COMMUNITY
Start: 2021-06-01 | End: 2024-06-21

## 2024-06-25 LAB — MOG AB SER QL CBA IFA: NEGATIVE

## 2024-06-25 NOTE — HISTORY OF PRESENT ILLNESS
[FreeTextEntry1] : 33 yof w/ new onset leg weakness and pain. SYmptoms began on 05/13/24. 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 Inquiring about any pain management options. Now a few weeks pregnant.

## 2024-06-25 NOTE — CONSULT LETTER
[Dear  ___] : Dear  [unfilled], [Consult Letter:] : I had the pleasure of evaluating your patient, [unfilled]. [Please see my note below.] : Please see my note below. [Consult Closing:] : Thank you very much for allowing me to participate in the care of this patient.  If you have any questions, please do not hesitate to contact me. [Sincerely,] : Sincerely, [FreeTextEntry3] : Gunner Best MD

## 2024-06-25 NOTE — ASSESSMENT
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  Continue excellent care w/ neurology.   No role for intervention as patient is pregnant and intervention would require fluoroscopy.   Physical therapy prescribed - goal will be to increase ROM, strengthening, postural training, other modalities ad ismi which may include massage and stim. Goals of therapy discussed with the patient in detail and will be discussed with physical therapist. Patient will follow-up following course of physical therapy to monitor progress and adjust therapy as needed.  Acetaminophen 1,000 mg q8h prn for moderate pain. Risks, benefits, and alternatives of acetaminophen discussed with patient.  Diet and nutritional strategies discussed which may improve patients pain and will improve overall health.

## 2024-06-25 NOTE — PHYSICAL EXAM
[de-identified] : Constitutional: Well-developed, in no acute distress  Psychiatric: Appropriate mood and affect, oriented to time, place, person, and situation

## 2024-06-28 ENCOUNTER — APPOINTMENT (OUTPATIENT)
Dept: NEUROLOGY | Facility: CLINIC | Age: 34
End: 2024-06-28
Payer: COMMERCIAL

## 2024-06-28 DIAGNOSIS — G04.91 MYELITIS, UNSPECIFIED: ICD-10-CM

## 2024-06-28 DIAGNOSIS — G95.81 CONUS MEDULLARIS SYNDROME: ICD-10-CM

## 2024-06-28 PROCEDURE — 99215 OFFICE O/P EST HI 40 MIN: CPT

## 2024-06-28 PROCEDURE — G2211 COMPLEX E/M VISIT ADD ON: CPT

## 2024-07-01 ENCOUNTER — TRANSCRIPTION ENCOUNTER (OUTPATIENT)
Age: 34
End: 2024-07-01

## 2024-07-11 ENCOUNTER — APPOINTMENT (OUTPATIENT)
Dept: NEUROLOGY | Facility: CLINIC | Age: 34
End: 2024-07-11
Payer: COMMERCIAL

## 2024-07-11 DIAGNOSIS — G04.91 MYELITIS, UNSPECIFIED: ICD-10-CM

## 2024-07-11 PROCEDURE — 99215 OFFICE O/P EST HI 40 MIN: CPT

## 2024-07-26 ENCOUNTER — RESULT REVIEW (OUTPATIENT)
Age: 34
End: 2024-07-26

## 2024-08-01 ENCOUNTER — APPOINTMENT (OUTPATIENT)
Dept: NEUROLOGY | Facility: CLINIC | Age: 34
End: 2024-08-01
Payer: COMMERCIAL

## 2024-08-01 DIAGNOSIS — G95.81 CONUS MEDULLARIS SYNDROME: ICD-10-CM

## 2024-08-01 DIAGNOSIS — G04.91 MYELITIS, UNSPECIFIED: ICD-10-CM

## 2024-08-01 PROBLEM — R94.8 ABNORMAL PET SCAN OF COLON: Status: ACTIVE | Noted: 2024-08-01

## 2024-08-01 PROCEDURE — G2211 COMPLEX E/M VISIT ADD ON: CPT

## 2024-08-01 PROCEDURE — 99215 OFFICE O/P EST HI 40 MIN: CPT

## 2024-08-01 RX ORDER — GABAPENTIN 300 MG/1
300 CAPSULE ORAL
Qty: 360 | Refills: 1 | Status: ACTIVE | COMMUNITY
Start: 2024-08-01 | End: 1900-01-01

## 2024-08-01 NOTE — ASSESSMENT
[FreeTextEntry1] : 33-year-old woman with a lower thoracic/conus myelitis during pregnancy with recent miscarriage who initially presented with lower extremity dysesthesia, R>L leg weakness, sensory ataxia with gait dysfunction.   She demonstrates early clinical response to corticosteroids with improvement in gait abnormalities and persistent residual dysesthesia and numbness.  MRI demonstrates a centrally predominant conus lesion with peripheral enhancement suggestive of CNS demyelination with increase in enhancement on recent imaging despite clinical improvement.  CSF inflammatory with pleocytosis and oligoclonal bands. 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  tendency for radiologic lag in MOGAD is described. It does not appear to be tumor, infection, or vascular in nature, more likely immune-mediated.  Serum MOG and aquaporin 4 autoantibody negative however sensitivity of detection is low while on immunotherapy.  The persistence and even progression of dorsal subpial enhancement raises the question of granulomatous inflammation (ie, neurosarcoidosis, IgG4-related disease, paraneoplastic myelitis, histiocytic neoplasm, less likely lymphoma given negative cytology and flow cytometry and negative serum protein electrophoresis.  Intramedullary glial or carcinomatous neoplasm is possible but less likely given the inflammatory CSF profile.  Cannot definitively rule out multiple sclerosis (MS) given the positive oligoclonal bands inflammatory CSF and subpial axial location of the lesion however would be somewhat atypical for the lesion to remain persistently enhancing in MS. Pat demonstrates dorsal subpial hypermetabolism corresponding with an area of persistent enhancement as well as focal uptake in the right ovary and anterior sigmoid colon.  Plan: Pelvic MRI with special attention to the right ovary Gastroenterology referral for consideration of colon biopsy for the focal anterior sigmoid uptake seen on PET Taper prednisone 10 mg by 1 mg every month. Start gabapentin 300 mg.  Start with 1 tab at night then increase by 1 tab every 3 days until at goal of 1 tab in the morning 1 tab midday and 2 tabs at night Can consider increasing dose of corticosteroids and adding IVIG if clinical deterioration Recheck serum aquaporin 4 and MOG antibody status at 6 months Explained that if clinical deterioration to go to the emergency department and contact our office immediately Referred to physiatry and physical therapy Follow-up after pelvic MRI and colonoscopy

## 2024-08-01 NOTE — DATA REVIEWED
[de-identified] : PET demonstrates increased uptake in the dorsal lower thoracic and conus spinal cord associated with hypermetabolic foci in the region of the right ovary and descending sigmoid. I recommended MRI pelvis with and without gadolinium to evaluate for enhancing foci suggestive of sarcoidosis or neoplasm to investigate for neurosarcoidosis or paraneoplastic myelitis.   Lumbar puncture June 4, 2024 CSF >20, RBC 0, protein 35, glucose 66 lymphocytic predominant, oligoclonal bands present, cytology/flow cytometry negative for malignancy, PCR negative, cultures negative, syphilis negative  aquaporin 4 negative (tested in CSF, not the preferred specimen) Lyme negative B12 735 Folate greater than 20 IgG index 0.7 HIV negative Vitamin D40.2 TSH 2.4 A1c 5.2  MRI brain cervical and thoracic with and without gadolinium June 2024 demonstrates a lower thoracic conus lesion centrally predominant mildly expansile T2 hyperintensity with a subtle focus of enhancement Repeat MRI brain cervical thoracic with and without gadolinium July 7, 2024 demonstrates a T2 hyperintense lesion involving the central gray and white matter with a T2 hypointense focus within the center of the signal abnormality.  There is an increase in T1 post gadolinium enhancement along the dorsal subpial spinal cord. No definitive signal abnormality elsewhere other than an isolated questionable periventricular T2 hyperintensity in the brain.  Serum MOG and aquaporin 4 testing negative

## 2024-08-01 NOTE — HISTORY OF PRESENT ILLNESS
[Home] : at home, [unfilled] , at the time of the visit. 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  Symptoms began May 13, 2024 with right buttock tingling.  May 21 she discovered that she was pregnant with a positive pregnancy test.  May 23 she developed a painful cramp of the left thigh.  May 25 there was pins-and-needles in both legs.  By May 26 both legs were entirely numb by June 1 she developed associated weakness and gait dysfunction as well as the numbness from the pelvis down both of her legs.  She got MRIs that demonstrated a lower thoracic conus spinal cord lesion.  She received IV methylprednisolone 1 g for 5 days followed by prednisone 60 mg tapered every 2 days x 10 mg and is currently on 30 mg daily.  June 11 she felt as though she had more left knee numbness and questionable change in the sensory characteristics in the back of the thigh with pins-and-needles and burning sensation.  She has been referred to a high risk maternal-fetal medicine specialist with an appointment July 8, 2024 (Dr. Yassine Carmona).  She has only been taking Tylenol for the neuropathic pain in her legs and finds it to be tolerable at this time.  She has significant gait dysfunction and feels as though she does not know where her legs are in space.  Since starting the corticosteroids she has experienced an improvement in motor strength but still has residual sensory dysfunction.  June 28, 2024 Sadly patient contacted us to inform that she had a miscarriage and is scheduled for a D&C procedure.  She was extensively counseled and comforted.  She is on prednisone 20 mg daily on a tapering schedule.  She reports considerable improvement in her gait with persistent numbness.  She is working with physiatry pain management and physical therapy.  July 11, 2024 Patient reports an improvement in her gait and strength.  She is tolerating the prednisone well and is currently taking prednisone 20 mg daily with plans to taper as prescribed.  She still has residual lower extremity numbness has persisted.  August 1, 2024  She is reportedly walking normally with residual burning paresthesias in the legs.  She is taking prednisone 10 mg.  She underwent a pelvic ultrasound that was normal for the right ovarian hypermetabolism seen on PET, pending pelvic MRI. She is scheduled to see gastroenterology to evaluate the focal uptake in the sigmoid colon.  She states that the sensory abnormalities have improved over the past few days decreasing the level down her legs.

## 2024-08-05 LAB
A-TUMOR NECROSIS FACT SERPL-MCNC: 9 PG/ML
ALBUMIN SERPL ELPH-MCNC: 4.6 G/DL
ALP BLD-CCNC: 40 U/L
ALT SERPL-CCNC: 13 U/L
ANION GAP SERPL CALC-SCNC: 11 MMOL/L
AST SERPL-CCNC: 15 U/L
BASOPHILS # BLD AUTO: 0.04 K/UL
BASOPHILS NFR BLD AUTO: 0.5 %
BILIRUB SERPL-MCNC: 0.7 MG/DL
BUN SERPL-MCNC: 13 MG/DL
CALCIUM SERPL-MCNC: 9.6 MG/DL
CANCER AG125 SERPL-ACNC: 13 U/ML
CANCER AG19-9 SERPL-ACNC: <2 U/ML
CD16+CD56+ CELLS # BLD: 151 CELLS/UL
CD16+CD56+ CELLS NFR BLD: 4 %
CD19 CELLS NFR BLD: 278 CELLS/UL
CD3 CELLS # BLD: 2949 CELLS/UL
CD3 CELLS NFR BLD: 86 %
CD3+CD4+ CELLS # BLD: 1732 CELLS/UL
CD3+CD4+ CELLS NFR BLD: 50 %
CD3+CD4+ CELLS/CD3+CD8+ CLL SPEC: 1.57 RATIO
CD3+CD8+ CELLS # SPEC: 1103 CELLS/UL
CD3+CD8+ CELLS NFR BLD: 32 %
CEA SERPL-MCNC: 1.3 NG/ML
CELLS.CD3-CD19+/CELLS IN BLOOD: 8 %
CHLORIDE SERPL-SCNC: 106 MMOL/L
CHOLEST SERPL-MCNC: 198 MG/DL
CO2 SERPL-SCNC: 25 MMOL/L
CREAT SERPL-MCNC: 0.84 MG/DL
EGFR: 95 ML/MIN/1.73M2
EOSINOPHIL # BLD AUTO: 0.07 K/UL
EOSINOPHIL NFR BLD AUTO: 0.8 %
ESTIMATED AVERAGE GLUCOSE: 91 MG/DL
GLUCOSE SERPL-MCNC: 90 MG/DL
HBA1C MFR BLD HPLC: 4.8 %
HCT VFR BLD CALC: 40.4 %
HDLC SERPL-MCNC: 108 MG/DL
HE4X: 41.4 PMOL/L
HGB BLD-MCNC: 12.5 G/DL
IGNF SERPL-MCNC: <4.2 PG/ML
IL10 SERPL-MCNC: <2.8 PG/ML
IL12 SERPL-MCNC: <1.9 PG/ML
IL13 SERPL-MCNC: 3.9 PG/ML
IL17A SERPL-MCNC: 2.2 PG/ML
IL2 SERPL-MCNC: 668.8 PG/ML
IL2 SERPL-MCNC: <2.1 PG/ML
IL4 SERPL-MCNC: <2.2 PG/ML
IL6 SERPL-MCNC: <2 PG/ML
IL8 SERPL-MCNC: <3 PG/ML
IMM GRANULOCYTES NFR BLD AUTO: 0.7 %
INTERLEUKIN 1 BETA: <6.5 PG/ML
INTERLEUKIN 5: <2.1 PG/ML
LDLC SERPL CALC-MCNC: 78 MG/DL
LYMPHOCYTES # BLD AUTO: 3.51 K/UL
LYMPHOCYTES NFR BLD AUTO: 39.6 %
MAN DIFF?: NORMAL
MCHC RBC-ENTMCNC: 29.8 PG
MCHC RBC-ENTMCNC: 30.9 GM/DL
MCV RBC AUTO: 96.2 FL
MONOCYTES # BLD AUTO: 0.81 K/UL
MONOCYTES NFR BLD AUTO: 9.1 %
NEUTROPHILS # BLD AUTO: 4.38 K/UL
NEUTROPHILS NFR BLD AUTO: 49.3 %
NONHDLC SERPL-MCNC: 90 MG/DL
PLATELET # BLD AUTO: 232 K/UL
POTASSIUM SERPL-SCNC: 4 MMOL/L
PROT SERPL-MCNC: 6.7 G/DL
RBC # BLD: 4.2 M/UL
RBC # FLD: 13.2 %
SODIUM SERPL-SCNC: 141 MMOL/L
TRIGL SERPL-MCNC: 63 MG/DL
WBC # FLD AUTO: 8.87 K/UL

## 2024-08-06 ENCOUNTER — NON-APPOINTMENT (OUTPATIENT)
Age: 34
End: 2024-08-06

## 2024-08-06 LAB
DEPRECATED KAPPA LC FREE/LAMBDA SER: 0.9 RATIO
IGA SER QL IEP: 103 MG/DL
IGG SER QL IEP: 706 MG/DL
IGM SER QL IEP: 230 MG/DL
KAPPA LC CSF-MCNC: 0.9 MG/DL
KAPPA LC SERPL-MCNC: 0.81 MG/DL

## 2024-08-08 ENCOUNTER — APPOINTMENT (OUTPATIENT)
Dept: GASTROENTEROLOGY | Facility: CLINIC | Age: 34
End: 2024-08-08

## 2024-08-08 ENCOUNTER — NON-APPOINTMENT (OUTPATIENT)
Age: 34
End: 2024-08-08

## 2024-08-08 PROBLEM — Z80.0 FAMILY HISTORY OF MALIGNANT NEOPLASM OF COLON: Status: ACTIVE | Noted: 2024-08-08

## 2024-08-08 PROCEDURE — 99204 OFFICE O/P NEW MOD 45 MIN: CPT

## 2024-08-08 RX ORDER — PREDNISONE 5 MG/1
5 TABLET ORAL
Qty: 200 | Refills: 3 | Status: DISCONTINUED | COMMUNITY
Start: 2024-08-01 | End: 2024-08-08

## 2024-08-08 RX ORDER — PREDNISONE 1 MG/1
1 TABLET ORAL
Qty: 200 | Refills: 5 | Status: DISCONTINUED | COMMUNITY
Start: 2024-08-01 | End: 2024-08-08

## 2024-08-08 RX ORDER — PREDNISONE 10 MG/1
10 TABLET ORAL
Qty: 30 | Refills: 1 | Status: DISCONTINUED | COMMUNITY
Start: 2024-08-01 | End: 2024-08-08

## 2024-08-08 NOTE — CONSULT LETTER
[Dear  ___] : Dear  [unfilled], [Consult Letter:] : I had the pleasure of evaluating your patient, [unfilled]. [Please see my note below.] : Please see my note below. [Consult Closing:] : Thank you very much for allowing me to participate in the care of this patient.  If you have any questions, please do not hesitate to contact me. [Sincerely,] : Sincerely, [FreeTextEntry3] : Thanh Delcid MD tel: [phone] fax: [phone]

## 2024-08-08 NOTE — PHYSICAL EXAM
[Alert] : alert [Sclera] : the sclera and conjunctiva were normal [Normal Appearance] : the appearance of the neck was normal [No Respiratory Distress] : no respiratory distress [None] : no edema [Abdomen Soft] : soft [No CVA Tenderness] : no CVA  tenderness [Abnormal Walk] : normal gait [Normal Color / Pigmentation] : normal skin color and pigmentation [No Focal Deficits] : no focal deficits [Oriented To Time, Place, And Person] : oriented to person, place, and time [de-identified] : Deferred pending colonoscopy

## 2024-08-08 NOTE — HISTORY OF PRESENT ILLNESS
[FreeTextEntry1] : HARLEEN ALICEA  is being evaluated at the request of  Dr. Mitchell for an opinion re: abnormal PEt scan.  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

## 2024-08-08 NOTE — ASSESSMENT
[FreeTextEntry1] : Abnormal  PET scan uptake in distal sigmoid colon. Unclear significance of PET scan findings.  Colonoscopy planned  Pertinent available records reviewed Risks of the procedure including but not limited to bleeding / perforation / infection / anesthesia complication / missed polyp or lesion explained to the  patient . The patient expressed understanding and a desire to proceed with the procedure.  Risk of not doing procedure includes but is not limited to missed or delayed diagnosis of gastrointestinal pathology.  A consultation note was provided to the referring provider

## 2024-08-09 ENCOUNTER — RESULT REVIEW (OUTPATIENT)
Age: 34
End: 2024-08-09

## 2024-08-15 ENCOUNTER — NON-APPOINTMENT (OUTPATIENT)
Age: 34
End: 2024-08-15

## 2024-08-16 ENCOUNTER — APPOINTMENT (OUTPATIENT)
Dept: NEUROLOGY | Facility: CLINIC | Age: 34
End: 2024-08-16
Payer: COMMERCIAL

## 2024-08-16 VITALS
HEIGHT: 68 IN | OXYGEN SATURATION: 100 % | HEART RATE: 69 BPM | BODY MASS INDEX: 30.01 KG/M2 | DIASTOLIC BLOOD PRESSURE: 83 MMHG | SYSTOLIC BLOOD PRESSURE: 128 MMHG | TEMPERATURE: 97.7 F | WEIGHT: 198 LBS

## 2024-08-16 DIAGNOSIS — G95.81 CONUS MEDULLARIS SYNDROME: ICD-10-CM

## 2024-08-16 DIAGNOSIS — G04.91 MYELITIS, UNSPECIFIED: ICD-10-CM

## 2024-08-16 PROCEDURE — 99215 OFFICE O/P EST HI 40 MIN: CPT

## 2024-08-16 PROCEDURE — G2211 COMPLEX E/M VISIT ADD ON: CPT

## 2024-08-16 NOTE — DATA REVIEWED
[de-identified] : Contrast-enhanced pelvic MRI August 14, 2024 unrevealing PET demonstrates increased uptake in the dorsal lower thoracic and conus spinal cord associated with hypermetabolic foci in the region of the right ovary and descending sigmoid. I recommended MRI pelvis with and without gadolinium to evaluate for enhancing foci suggestive of sarcoidosis or neoplasm to investigate for neurosarcoidosis or paraneoplastic myelitis.   Lumbar puncture June 4, 2024 CSF >20, RBC 0, protein 35, glucose 66 lymphocytic predominant, oligoclonal bands present, cytology/flow cytometry negative for malignancy, PCR negative, cultures negative, syphilis negative  aquaporin 4 negative (tested in CSF, not the preferred specimen) Lyme negative B12 735 Folate greater than 20 IgG index 0.7 HIV negative Vitamin D40.2 TSH 2.4 A1c 5.2  MRI brain cervical and thoracic with and without gadolinium June 2024 demonstrates a lower thoracic conus lesion centrally predominant mildly expansile T2 hyperintensity with a subtle focus of enhancement Repeat MRI brain cervical thoracic with and without gadolinium July 7, 2024 demonstrates a T2 hyperintense lesion involving the left thalmus and left Occipital horn periventricular white matter.  There is an increase in T1 post gadolinium enhancement along the dorsal subpial spinal cord. No definitive signal abnormality elsewhere other than an isolated questionable periventricular T2 hyperintensity in the brain.  June 2024 Serum MOG and aquaporin 4 testing negative

## 2024-08-16 NOTE — ASSESSMENT
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 She initially presented with lower extremity dysesthesia, R>L leg weakness, sensory ataxia with gait dysfunction.   She demonstrates early clinical response to corticosteroids with improvement in gait abnormalities and persistent residual dysesthesia and numbness.  MRI demonstrates a centrally predominant conus lesion with peripheral enhancement suggestive of CNS demyelination with increase in enhancement on subsequent imaging despite clinical improvement.  CSF inflammatory with pleocytosis and oligoclonal bands. Serum MOG and aquaporin 4 autoantibody negative.  The persistence and even progression of dorsal subpial enhancement raises the question of granulomatous inflammation (ie, neurosarcoidosis, IgG4-related disease, paraneoplastic myelitis, histiocytic neoplasm, less likely lymphoma given negative cytology and flow cytometry and negative serum protein electrophoresis. Cannot definitively rule out multiple sclerosis (MS) given the left occipital horn periventricular lesion, positive oligoclonal bands, inflammatory CSF and subpial axial location of the cord lesion; however, would be somewhat atypical for the lesion to remain persistently enhancing in MS. FDG-PET demonstrates dorsal subpial hypermetabolism corresponding with an area of persistent enhancement as well as focal uptake in the right ovary and anterior sigmoid colon.  MR ovary normal.  Pending colonoscopy.  Leading diagnostic considerations include neurosarcoidosis versus an atypical presentation of multiple sclerosis or an idiopathic myelitis.  Plan: Repeat MRI thoracic spine with and without gadolinium to assess treatment response Repeat aquaporin 4 and MOG testing December 2024 Taper prednisone 10 mg by 1 mg every 2 weeks Gabapentin 300 mg.  Start with 1 tab at night then increase by 1 tab every 3 days until at goal of 1 tab in the morning 1 tab midday and 2 tabs at night Can consider increasing dose of corticosteroids and adding IVIG if clinical deterioration Explained that if clinical deterioration to go to the emergency department and contact our office immediately Referred to physiatry and physical therapy Colonoscopy pending Follow-up after thoracic MRI

## 2024-08-16 NOTE — HISTORY OF PRESENT ILLNESS
[Home] : at home, [unfilled] , at the time of the visit. 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  Symptoms began May 13, 2024 with right buttock tingling.  May 21 she discovered that she was pregnant with a positive pregnancy test.  May 23 she developed a painful cramp of the left thigh.  May 25 there was pins-and-needles in both legs.  By May 26 both legs were entirely numb by June 1 she developed associated weakness and gait dysfunction as well as the numbness from the pelvis down both of her legs.  She got MRIs that demonstrated a lower thoracic conus spinal cord lesion.  She received IV methylprednisolone 1 g for 5 days followed by prednisone 60 mg tapered every 2 days x 10 mg and is currently on 30 mg daily.  June 11 she felt as though she had more left knee numbness and questionable change in the sensory characteristics in the back of the thigh with pins-and-needles and burning sensation.  She has been referred to a high risk maternal-fetal medicine specialist with an appointment July 8, 2024 (Dr. Yassine Carmona).  She has only been taking Tylenol for the neuropathic pain in her legs and finds it to be tolerable at this time.  She has significant gait dysfunction and feels as though she does not know where her legs are in space.  Since starting the corticosteroids she has experienced an improvement in motor strength but still has residual sensory dysfunction.  June 28, 2024 Sadly patient contacted us to inform that she had a miscarriage and is scheduled for a D&C procedure.  She was extensively counseled and comforted.  She is on prednisone 20 mg daily on a tapering schedule.  She reports considerable improvement in her gait with persistent numbness.  She is working with physiatry pain management and physical therapy.  July 11, 2024 Patient reports an improvement in her gait and strength.  She is tolerating the prednisone well and is currently taking prednisone 20 mg daily with plans to taper as prescribed.  She still has residual lower extremity numbness has persisted.  August 1, 2024  She is reportedly walking normally with residual burning paresthesias in the legs.  She is taking prednisone 10 mg.  She underwent a pelvic ultrasound that was normal for the right ovarian hypermetabolism seen on PET, pending pelvic MRI. She is scheduled to see gastroenterology to evaluate the focal uptake in the sigmoid colon.  She states that the sensory abnormalities have improved over the past few days decreasing the level down her legs.  August 16, 2024 Improvement in strength and sensation.  She is able to run 4 miles and has noticed an improvement in sensation of temperature and textures on her legs.  No bladder issues.

## 2024-08-24 ENCOUNTER — RESULT REVIEW (OUTPATIENT)
Age: 34
End: 2024-08-24

## 2024-08-27 ENCOUNTER — RESULT REVIEW (OUTPATIENT)
Age: 34
End: 2024-08-27

## 2024-08-27 ENCOUNTER — APPOINTMENT (OUTPATIENT)
Dept: GASTROENTEROLOGY | Facility: HOSPITAL | Age: 34
End: 2024-08-27

## 2024-08-30 ENCOUNTER — NON-APPOINTMENT (OUTPATIENT)
Age: 34
End: 2024-08-30

## 2024-09-03 ENCOUNTER — NON-APPOINTMENT (OUTPATIENT)
Age: 34
End: 2024-09-03

## 2024-09-06 ENCOUNTER — APPOINTMENT (OUTPATIENT)
Dept: NEUROLOGY | Facility: CLINIC | Age: 34
End: 2024-09-06
Payer: COMMERCIAL

## 2024-09-06 DIAGNOSIS — G04.91 MYELITIS, UNSPECIFIED: ICD-10-CM

## 2024-09-06 DIAGNOSIS — G95.81 CONUS MEDULLARIS SYNDROME: ICD-10-CM

## 2024-09-06 PROCEDURE — 99215 OFFICE O/P EST HI 40 MIN: CPT

## 2024-09-06 PROCEDURE — G2211 COMPLEX E/M VISIT ADD ON: CPT | Mod: NC

## 2024-09-06 NOTE — HISTORY OF PRESENT ILLNESS
[Home] : at home, [unfilled] , at the time of the visit. 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  Symptoms began May 13, 2024 with right buttock tingling.  May 21 she discovered that she was pregnant with a positive pregnancy test.  May 23 she developed a painful cramp of the left thigh.  May 25 there was pins-and-needles in both legs.  By May 26 both legs were entirely numb by June 1 she developed associated weakness and gait dysfunction as well as the numbness from the pelvis down both of her legs.  She got MRIs that demonstrated a lower thoracic conus spinal cord lesion.  She received IV methylprednisolone 1 g for 5 days followed by prednisone 60 mg tapered every 2 days x 10 mg and is currently on 30 mg daily.  June 11 she felt as though she had more left knee numbness and questionable change in the sensory characteristics in the back of the thigh with pins-and-needles and burning sensation.  She has been referred to a high risk maternal-fetal medicine specialist with an appointment July 8, 2024 (Dr. Yassine Carmona).  She has only been taking Tylenol for the neuropathic pain in her legs and finds it to be tolerable at this time.  She has significant gait dysfunction and feels as though she does not know where her legs are in space.  Since starting the corticosteroids she has experienced an improvement in motor strength but still has residual sensory dysfunction.  June 28, 2024 Sadly patient contacted us to inform that she had a miscarriage and is scheduled for a D&C procedure.  She was extensively counseled and comforted.  She is on prednisone 20 mg daily on a tapering schedule.  She reports considerable improvement in her gait with persistent numbness.  She is working with physiatry pain management and physical therapy.  July 11, 2024 Patient reports an improvement in her gait and strength.  She is tolerating the prednisone well and is currently taking prednisone 20 mg daily with plans to taper as prescribed.  She still has residual lower extremity numbness has persisted.  August 1, 2024  She is reportedly walking normally with residual burning paresthesias in the legs.  She is taking prednisone 10 mg.  She underwent a pelvic ultrasound that was normal for the right ovarian hypermetabolism seen on PET, pending pelvic MRI. She is scheduled to see gastroenterology to evaluate the focal uptake in the sigmoid colon.  She states that the sensory abnormalities have improved over the past few days decreasing the level down her legs.  August 16, 2024 Improvement in strength and sensation.  She is able to run 4 miles and has noticed an improvement in sensation of temperature and textures on her legs.  No bladder issues.   August 30, 2024 Discussed MRI findings with patient. Clinically and radiologically trending towards improvement. Colonoscopy reportedly unrevealing. On prednisone 8 mg decreasing by 1 mg every 2 weeks.  September 3, 2024 Patient called. Has a rash on the face. Primary care believes it is poison ivy. Patient has had similar poison ivy rashes in the past. Scheduled to see dermatology. Plan is high-dose prednisone taper. No contraindication from a neurological standpoint as long as the taper continues following the transient increase in prednisone to treat the rash. I also encouraged her to ensure that dermatology agrees with the diagnosis and plan.  September 6, 2024 On Prednisone 40mg decreasing by 10mg every 3 days with improvement in skin rash. Burning in the right leg and foot.  She is taking 600 mg gabapentin at night which is helping with the symptoms and discontinued the day use because it was making her sleepy.

## 2024-09-06 NOTE — ASSESSMENT
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 She initially presented with lower extremity dysesthesia, R>L leg weakness, sensory ataxia with gait dysfunction.   She demonstrates early clinical response to corticosteroids with improvement in gait abnormalities and persistent residual dysesthesia and numbness.  MRI demonstrates a centrally predominant conus lesion with peripheral enhancement suggestive of CNS demyelination with increase in enhancement on subsequent imaging despite clinical improvement.  CSF inflammatory with pleocytosis and oligoclonal bands. Serum MOG and aquaporin 4 autoantibody negative.  The persistence and even progression of dorsal subpial enhancement raises the question of granulomatous inflammation (ie, neurosarcoidosis, IgG4-related disease, paraneoplastic myelitis, histiocytic neoplasm, less likely lymphoma given negative cytology and flow cytometry and negative serum protein electrophoresis. Cannot definitively rule out multiple sclerosis (MS) given the left occipital horn periventricular lesion, positive oligoclonal bands, inflammatory CSF and subpial axial location of the cord lesion; however, would be somewhat atypical for the lesion to remain persistently enhancing in MS. FDG-PET demonstrates dorsal subpial hypermetabolism corresponding with an area of persistent enhancement as well as focal uptake in the right ovary and anterior sigmoid colon.  MR ovary normal.  Colonoscopy normal.  Repeat MRI demonstrates interval improvement in signal abnormality and near resolution of enhancement corresponding to clinical improvement with residual dysesthesia responsive to gabapentin.  Leading diagnostic considerations include neurosarcoidosis versus an atypical presentation of multiple sclerosis or an idiopathic myelitis.  Plan: Repeat MRI brain cervical and thoracic spine with and without gadolinium to assess treatment response, and evaluate for new lesions early December 2024 Repeat aquaporin 4 and MOG testing December 2024 Currently on 40 mg prednisone for poison ivy decreasing by 10 mg every 3 days.  Recommended slower taper at lower dose.  Taper prednisone at 10 mg by 2.5 mg every 2 weeks Gabapentin 300 mg tabs.  Take 1 tab in the morning 1 tab midday and 2 tabs at night as tolerated for neuropathic pain.   Can consider increasing dose of corticosteroids and adding IVIG if clinical deterioration Explained that if clinical deterioration to go to the emergency department and contact our office immediately Referred to physiatry and physical therapy Follow-up after MRI in December 2024

## 2024-09-06 NOTE — DATA REVIEWED
[de-identified] : Contrast-enhanced pelvic MRI August 14, 2024 unrevealing PET demonstrates increased uptake in the dorsal lower thoracic and conus spinal cord associated with hypermetabolic foci in the region of the right ovary and descending sigmoid. I recommended MRI pelvis with and without gadolinium to evaluate for enhancing foci suggestive of sarcoidosis or neoplasm to investigate for neurosarcoidosis or paraneoplastic myelitis.   Lumbar puncture June 4, 2024 CSF >20, RBC 0, protein 35, glucose 66 lymphocytic predominant, oligoclonal bands present, cytology/flow cytometry negative for malignancy, PCR negative, cultures negative, syphilis negative  aquaporin 4 negative (tested in CSF, not the preferred specimen) Lyme negative B12 735 Folate greater than 20 IgG index 0.7 HIV negative Vitamin D40.2 TSH 2.4 A1c 5.2  MRI brain cervical and thoracic with and without gadolinium June 2024 demonstrates a lower thoracic conus lesion centrally predominant mildly expansile T2 hyperintensity with a subtle focus of enhancement Repeat MRI brain cervical thoracic with and without gadolinium July 7, 2024 demonstrates a T2 hyperintense lesion involving the left thalmus and left Occipital horn periventricular white matter.  There is an increase in T1 post gadolinium enhancement along the dorsal subpial spinal cord. No definitive signal abnormality elsewhere other than an isolated questionable periventricular T2 hyperintensity in the brain.  June 2024 Serum MOG and aquaporin 4 testing negative

## 2024-10-07 ENCOUNTER — RX RENEWAL (OUTPATIENT)
Age: 34
End: 2024-10-07

## 2024-10-23 ENCOUNTER — APPOINTMENT (OUTPATIENT)
Dept: NEUROLOGY | Facility: CLINIC | Age: 34
End: 2024-10-23
Payer: COMMERCIAL

## 2024-10-23 DIAGNOSIS — G04.91 MYELITIS, UNSPECIFIED: ICD-10-CM

## 2024-10-23 DIAGNOSIS — G95.81 CONUS MEDULLARIS SYNDROME: ICD-10-CM

## 2024-10-23 PROCEDURE — 99214 OFFICE O/P EST MOD 30 MIN: CPT

## 2024-12-06 ENCOUNTER — RESULT REVIEW (OUTPATIENT)
Age: 34
End: 2024-12-06

## 2024-12-13 ENCOUNTER — APPOINTMENT (OUTPATIENT)
Dept: NEUROLOGY | Facility: CLINIC | Age: 34
End: 2024-12-13
Payer: COMMERCIAL

## 2024-12-13 VITALS
OXYGEN SATURATION: 100 % | HEART RATE: 91 BPM | SYSTOLIC BLOOD PRESSURE: 135 MMHG | DIASTOLIC BLOOD PRESSURE: 90 MMHG | TEMPERATURE: 97.7 F | BODY MASS INDEX: 28.49 KG/M2 | WEIGHT: 188 LBS | HEIGHT: 68 IN

## 2024-12-13 DIAGNOSIS — G04.91 MYELITIS, UNSPECIFIED: ICD-10-CM

## 2024-12-13 PROCEDURE — 99214 OFFICE O/P EST MOD 30 MIN: CPT

## 2024-12-16 LAB
ALBUMIN SERPL ELPH-MCNC: 4.5 G/DL
ALP BLD-CCNC: 56 U/L
ALT SERPL-CCNC: 19 U/L
ANION GAP SERPL CALC-SCNC: 12 MMOL/L
AST SERPL-CCNC: 17 U/L
BASOPHILS # BLD AUTO: 0.03 K/UL
BASOPHILS NFR BLD AUTO: 0.4 %
BILIRUB SERPL-MCNC: 0.2 MG/DL
BUN SERPL-MCNC: 15 MG/DL
CALCIUM SERPL-MCNC: 9.5 MG/DL
CHLORIDE SERPL-SCNC: 106 MMOL/L
CO2 SERPL-SCNC: 25 MMOL/L
CREAT SERPL-MCNC: 0.8 MG/DL
EGFR: 100 ML/MIN/1.73M2
EOSINOPHIL # BLD AUTO: 0.05 K/UL
EOSINOPHIL NFR BLD AUTO: 0.6 %
GLUCOSE SERPL-MCNC: 104 MG/DL
HCT VFR BLD CALC: 39 %
HGB BLD-MCNC: 12.8 G/DL
IMM GRANULOCYTES NFR BLD AUTO: 0.2 %
LYMPHOCYTES # BLD AUTO: 2.85 K/UL
LYMPHOCYTES NFR BLD AUTO: 35 %
MAN DIFF?: NORMAL
MCHC RBC-ENTMCNC: 29.8 PG
MCHC RBC-ENTMCNC: 32.8 G/DL
MCV RBC AUTO: 90.7 FL
MONOCYTES # BLD AUTO: 0.71 K/UL
MONOCYTES NFR BLD AUTO: 8.7 %
NEUTROPHILS # BLD AUTO: 4.49 K/UL
NEUTROPHILS NFR BLD AUTO: 55.1 %
PLATELET # BLD AUTO: 289 K/UL
POTASSIUM SERPL-SCNC: 4.1 MMOL/L
PROT SERPL-MCNC: 7 G/DL
RBC # BLD: 4.3 M/UL
RBC # FLD: 11.8 %
SODIUM SERPL-SCNC: 143 MMOL/L
WBC # FLD AUTO: 8.15 K/UL

## 2024-12-18 LAB — AQP4 H2O CHANNEL AB SERPL IA-ACNC: NEGATIVE

## 2024-12-21 LAB — MOG AB SER QL CBA IFA: NEGATIVE

## 2024-12-25 PROBLEM — F10.90 ALCOHOL USE: Status: ACTIVE | Noted: 2019-12-16

## 2025-05-01 ENCOUNTER — APPOINTMENT (OUTPATIENT)
Dept: NEUROLOGY | Facility: CLINIC | Age: 35
End: 2025-05-01
Payer: COMMERCIAL

## 2025-05-01 DIAGNOSIS — G04.91 MYELITIS, UNSPECIFIED: ICD-10-CM

## 2025-05-01 DIAGNOSIS — G95.81 CONUS MEDULLARIS SYNDROME: ICD-10-CM

## 2025-05-01 PROCEDURE — 99214 OFFICE O/P EST MOD 30 MIN: CPT | Mod: 95

## 2025-05-21 ENCOUNTER — APPOINTMENT (OUTPATIENT)
Dept: NEUROLOGY | Facility: CLINIC | Age: 35
End: 2025-05-21

## 2025-09-05 ENCOUNTER — TRANSCRIPTION ENCOUNTER (OUTPATIENT)
Age: 35
End: 2025-09-05